# Patient Record
Sex: FEMALE | Race: OTHER | HISPANIC OR LATINO | Employment: UNEMPLOYED | RURAL
[De-identification: names, ages, dates, MRNs, and addresses within clinical notes are randomized per-mention and may not be internally consistent; named-entity substitution may affect disease eponyms.]

---

## 2024-11-13 ENCOUNTER — OFFICE VISIT (OUTPATIENT)
Dept: FAMILY MEDICINE | Facility: CLINIC | Age: 44
End: 2024-11-13
Payer: COMMERCIAL

## 2024-11-13 VITALS
TEMPERATURE: 98 F | HEIGHT: 63 IN | BODY MASS INDEX: 26.19 KG/M2 | HEART RATE: 71 BPM | SYSTOLIC BLOOD PRESSURE: 153 MMHG | DIASTOLIC BLOOD PRESSURE: 98 MMHG | WEIGHT: 147.81 LBS | OXYGEN SATURATION: 100 % | RESPIRATION RATE: 18 BRPM

## 2024-11-13 DIAGNOSIS — Z11.4 SCREENING FOR HIV (HUMAN IMMUNODEFICIENCY VIRUS): ICD-10-CM

## 2024-11-13 DIAGNOSIS — Z71.9 HEALTH EDUCATION/COUNSELING: ICD-10-CM

## 2024-11-13 DIAGNOSIS — I10 PRIMARY HYPERTENSION: Primary | ICD-10-CM

## 2024-11-13 DIAGNOSIS — Z11.59 NEED FOR HEPATITIS C SCREENING TEST: ICD-10-CM

## 2024-11-13 PROCEDURE — 3077F SYST BP >= 140 MM HG: CPT | Mod: CPTII,,, | Performed by: FAMILY MEDICINE

## 2024-11-13 PROCEDURE — 1159F MED LIST DOCD IN RCRD: CPT | Mod: CPTII,,, | Performed by: FAMILY MEDICINE

## 2024-11-13 PROCEDURE — 99203 OFFICE O/P NEW LOW 30 MIN: CPT | Mod: GC,,, | Performed by: FAMILY MEDICINE

## 2024-11-13 PROCEDURE — 3080F DIAST BP >= 90 MM HG: CPT | Mod: CPTII,,, | Performed by: FAMILY MEDICINE

## 2024-11-13 PROCEDURE — 3008F BODY MASS INDEX DOCD: CPT | Mod: CPTII,,, | Performed by: FAMILY MEDICINE

## 2024-11-13 PROCEDURE — 4010F ACE/ARB THERAPY RXD/TAKEN: CPT | Mod: CPTII,,, | Performed by: FAMILY MEDICINE

## 2024-11-13 RX ORDER — ERGOCALCIFEROL 1.25 MG/1
50000 CAPSULE ORAL
COMMUNITY

## 2024-11-13 RX ORDER — LISINOPRIL AND HYDROCHLOROTHIAZIDE 12.5; 2 MG/1; MG/1
1 TABLET ORAL DAILY
COMMUNITY

## 2024-11-13 NOTE — PROGRESS NOTES
Subjective:       Patient ID: Edelmira Kelly is a 44 y.o. female.    Chief Complaint: Establish Care (Room 8 establish care, med refills, denies pain) and Health Maintenance (Tated mammogram was done 01/2024, colonoscope this year as well )    44 year old female presents to the clinic today to Establish care. States that has done screening test recently and we will get records.           Current Outpatient Medications:     ergocalciferol (VITAMIN D2) 50,000 unit Cap, Take 50,000 Units by mouth every 7 days., Disp: , Rfl:     lisinopriL-hydrochlorothiazide (PRINZIDE,ZESTORETIC) 20-12.5 mg per tablet, Take 1 tablet by mouth once daily., Disp: , Rfl:     Review of patient's allergies indicates:  No Known Allergies    Past Medical History:   Diagnosis Date    Hypertension        Past Surgical History:   Procedure Laterality Date    CHOLECYSTECTOMY         Family History   Problem Relation Name Age of Onset    Hypertension Mother      Diabetes Mellitus Father      Stroke Father         Social History     Tobacco Use    Smoking status: Never    Smokeless tobacco: Never   Substance Use Topics    Drug use: Never       Review of Systems   Constitutional:  Negative for appetite change, fatigue and fever.   HENT:  Negative for ear pain.    Eyes:  Negative for visual disturbance.   Respiratory:  Negative for cough, chest tightness and shortness of breath.    Cardiovascular:  Negative for chest pain and leg swelling.   Gastrointestinal:  Negative for abdominal pain, diarrhea, nausea and vomiting.   Genitourinary:  Negative for difficulty urinating and flank pain.   Musculoskeletal:  Negative for back pain and gait problem.   Integumentary:  Negative for rash and wound.   Neurological:  Negative for dizziness, syncope, light-headedness and headaches.   Psychiatric/Behavioral:  Negative for agitation and confusion.            Objective:      Vitals:    11/13/24 1441   BP: (!) 153/98   BP Location: Left arm   Patient Position:  "Sitting   Pulse: 71   Resp: 18   Temp: 97.9 °F (36.6 °C)   TempSrc: Oral   SpO2: 100%   Weight: 67 kg (147 lb 12.8 oz)   Height: 5' 3" (1.6 m)     Physical Exam  Vitals reviewed.   Constitutional:       General: She is not in acute distress.     Appearance: Normal appearance.   HENT:      Head: Normocephalic and atraumatic.      Right Ear: External ear normal.      Left Ear: External ear normal.      Nose: Nose normal. No congestion.      Mouth/Throat:      Mouth: Mucous membranes are moist.   Eyes:      Conjunctiva/sclera: Conjunctivae normal.   Cardiovascular:      Rate and Rhythm: Normal rate and regular rhythm.      Pulses: Normal pulses.      Heart sounds: Normal heart sounds.   Pulmonary:      Effort: Pulmonary effort is normal. No respiratory distress.      Breath sounds: Normal breath sounds.   Abdominal:      General: Bowel sounds are normal.   Musculoskeletal:         General: Normal range of motion.      Cervical back: Normal range of motion.   Skin:     General: Skin is warm.      Coloration: Skin is not jaundiced.   Neurological:      General: No focal deficit present.      Mental Status: She is alert.   Psychiatric:         Mood and Affect: Mood normal.         Behavior: Behavior normal.         Thought Content: Thought content normal.           Lab Results   Component Value Date    WBC 5.97 08/03/2022    HGB 13.8 08/03/2022    HCT 40.8 08/03/2022     08/03/2022    ALT 30 08/03/2022    AST 15 08/03/2022     08/03/2022    K 3.4 (L) 08/03/2022     08/03/2022    CREATININE 0.65 08/03/2022    BUN 10 08/03/2022    CO2 24 08/03/2022    TSH 0.774 08/03/2022    HGBA1C 6.0 08/03/2022      Assessment:       1. Primary hypertension    2. Need for hepatitis C screening test    3. Health education/counseling    4. Screening for HIV (human immunodeficiency virus)        Plan:         Problem List Items Addressed This Visit       HTN (hypertension) - Primary (Chronic)     BP today 153/98  Patient " states that did not take her medications for the past 2 days.  States that wish she could control her BP with no medications.  Counseling given about BP medications and long term complications of uncontrolled blood pressure.   Patient wants to establish care with us and states that has done blood work recently. She will get lab work records and bring to us. We will request medical records. Hold on blood work request. Await for records.  - return in 2 weeks    - monitor BP at home  - watch out for symptoms as headaches, dizziness, chest pain, palpitations, lightheadedness, or blurred vision  - manage a BP log and bring it to your next appointment  - maintain healthy life style with dietary restriction such as low salt and low sodium diet. Follow DASH diet.   - engage in physical exercise for at least 30 minutes a day/5 days a week  - take medications regularly as prescribed.              Health education/counseling     Lifestyle changes recommendations given.  Print out given about 6 pillars and recipes.    States some Anxiety but states that do not want medication for it and want to try lifestyle changes to help with that.           Other Visit Diagnoses       Need for hepatitis C screening test        Relevant Orders    Hepatitis C Antibody    Screening for HIV (human immunodeficiency virus)        Relevant Orders    HIV 1/2 Ag/Ab (4th Gen)              Follow up in about 2 weeks (around 11/27/2024).    Maciej Johnson MD     Instructed patient that if symptoms fail to improve or worsen patient should seek immediate medical attention or report to the nearest emergency department. Patient expressed verbal agreement and understanding to this plan of care.

## 2024-11-13 NOTE — ASSESSMENT & PLAN NOTE
BP today 153/98  Patient states that did not take her medications for the past 2 days.  States that wish she could control her BP with no medications.  Counseling given about BP medications and long term complications of uncontrolled blood pressure.   Patient wants to establish care with us and states that has done blood work recently. She will get lab work records and bring to us. We will request medical records. Hold on blood work request. Await for records.  - return in 2 weeks    - monitor BP at home  - watch out for symptoms as headaches, dizziness, chest pain, palpitations, lightheadedness, or blurred vision  - manage a BP log and bring it to your next appointment  - maintain healthy life style with dietary restriction such as low salt and low sodium diet. Follow DASH diet.   - engage in physical exercise for at least 30 minutes a day/5 days a week  - take medications regularly as prescribed.

## 2024-11-13 NOTE — ASSESSMENT & PLAN NOTE
Lifestyle changes recommendations given.  Print out given about 6 pillars and recipes.    States some Anxiety but states that do not want medication for it and want to try lifestyle changes to help with that.    0 = swallows foods/liquids without difficulty

## 2024-11-27 ENCOUNTER — OFFICE VISIT (OUTPATIENT)
Dept: FAMILY MEDICINE | Facility: CLINIC | Age: 44
End: 2024-11-27
Payer: COMMERCIAL

## 2024-11-27 VITALS
OXYGEN SATURATION: 98 % | TEMPERATURE: 98 F | BODY MASS INDEX: 25.87 KG/M2 | HEIGHT: 63 IN | SYSTOLIC BLOOD PRESSURE: 123 MMHG | HEART RATE: 84 BPM | DIASTOLIC BLOOD PRESSURE: 80 MMHG | RESPIRATION RATE: 16 BRPM | WEIGHT: 146 LBS

## 2024-11-27 DIAGNOSIS — I10 PRIMARY HYPERTENSION: Primary | Chronic | ICD-10-CM

## 2024-11-27 DIAGNOSIS — Z71.9 HEALTH EDUCATION/COUNSELING: ICD-10-CM

## 2024-11-27 RX ORDER — PANTOPRAZOLE SODIUM 40 MG/1
40 TABLET, DELAYED RELEASE ORAL DAILY
COMMUNITY
Start: 2024-11-14 | End: 2025-11-14

## 2024-11-27 RX ORDER — ASPIRIN 325 MG
TABLET, DELAYED RELEASE (ENTERIC COATED) ORAL
COMMUNITY
Start: 2024-11-25

## 2024-11-27 NOTE — ASSESSMENT & PLAN NOTE
BP today 123/80  Patient states that has been taking her medications daily as prescribed.   Counseling given about BP medications and long term complications of uncontrolled blood pressure.   She will get lab work records and bring to us. Medical records requested.   Hold on blood work request. Await for records.  - return in 3 months.     - monitor BP at home  - watch out for symptoms as headaches, dizziness, chest pain, palpitations, lightheadedness, or blurred vision  - manage a BP log and bring it to your next appointment  - maintain healthy life style with dietary restriction such as low salt and low sodium diet. Follow DASH diet.   - engage in physical exercise for at least 30 minutes a day/5 days a week  - take medications regularly as prescribed.

## 2024-11-27 NOTE — PROGRESS NOTES
"Subjective:       Patient ID: Edelmira Kelly is a 44 y.o. female.    Chief Complaint: Follow-up    44 year old female presents to the clinic today for HTN 2 weeks follow up. States that is taking medications as prescribed.           Current Outpatient Medications:     cholecalciferol, vitamin D3, 1,250 mcg (50,000 unit) capsule, SMARTSI.0 Capsule(s) By Mouth Once a Week, Disp: , Rfl:     lisinopriL-hydrochlorothiazide (PRINZIDE,ZESTORETIC) 20-12.5 mg per tablet, Take 1 tablet by mouth once daily., Disp: , Rfl:     pantoprazole (PROTONIX) 40 MG tablet, Take 40 mg by mouth once daily., Disp: , Rfl:     ergocalciferol (VITAMIN D2) 50,000 unit Cap, Take 50,000 Units by mouth every 7 days. (Patient not taking: Reported on 2024), Disp: , Rfl:     Review of patient's allergies indicates:  No Known Allergies    Past Medical History:   Diagnosis Date    Hypertension        Past Surgical History:   Procedure Laterality Date    CHOLECYSTECTOMY         Family History   Problem Relation Name Age of Onset    Hypertension Mother      Diabetes Mellitus Father      Stroke Father         Social History     Tobacco Use    Smoking status: Never    Smokeless tobacco: Never   Substance Use Topics    Drug use: Never       Review of Systems   Constitutional:  Negative for fatigue and fever.   Eyes:  Negative for visual disturbance.   Respiratory:  Negative for shortness of breath.    Cardiovascular:  Negative for chest pain and leg swelling.   Gastrointestinal:  Negative for abdominal pain.   Genitourinary:  Negative for flank pain.   Neurological:  Negative for dizziness and headaches.           Objective:      Vitals:    24 1509   BP: 123/80   BP Location: Left arm   Patient Position: Sitting   Pulse: 84   Resp: 16   Temp: 98 °F (36.7 °C)   TempSrc: Oral   SpO2: 98%   Weight: 66.2 kg (146 lb)   Height: 5' 3" (1.6 m)     Physical Exam  Vitals reviewed.   Constitutional:       General: She is not in acute distress.     " Appearance: Normal appearance.   HENT:      Head: Normocephalic and atraumatic.      Right Ear: External ear normal.      Left Ear: External ear normal.      Nose: Nose normal. No congestion.      Mouth/Throat:      Mouth: Mucous membranes are moist.   Eyes:      Conjunctiva/sclera: Conjunctivae normal.   Cardiovascular:      Rate and Rhythm: Normal rate and regular rhythm.      Pulses: Normal pulses.      Heart sounds: Normal heart sounds. No murmur heard.  Pulmonary:      Effort: Pulmonary effort is normal. No respiratory distress.      Breath sounds: Normal breath sounds.   Abdominal:      General: Bowel sounds are normal.   Musculoskeletal:         General: Normal range of motion.      Cervical back: Normal range of motion.   Skin:     General: Skin is warm.      Coloration: Skin is not jaundiced.   Neurological:      General: No focal deficit present.      Mental Status: She is alert.   Psychiatric:         Mood and Affect: Mood normal.         Behavior: Behavior normal.         Thought Content: Thought content normal.           Lab Results   Component Value Date    WBC 5.97 08/03/2022    HGB 13.8 08/03/2022    HCT 40.8 08/03/2022     08/03/2022    ALT 30 08/03/2022    AST 15 08/03/2022     08/03/2022    K 3.4 (L) 08/03/2022     08/03/2022    CREATININE 0.65 08/03/2022    BUN 10 08/03/2022    CO2 24 08/03/2022    TSH 0.774 08/03/2022    HGBA1C 6.0 08/03/2022      Assessment:       1. Primary hypertension    2. Health education/counseling        Plan:         Problem List Items Addressed This Visit       HTN (hypertension) - Primary (Chronic)     BP today 123/80  Patient states that has been taking her medications daily as prescribed.   Counseling given about BP medications and long term complications of uncontrolled blood pressure.   She will get lab work records and bring to us. Medical records requested.   Hold on blood work request. Await for records.  - return in 3 months.     - monitor  BP at home  - watch out for symptoms as headaches, dizziness, chest pain, palpitations, lightheadedness, or blurred vision  - manage a BP log and bring it to your next appointment  - maintain healthy life style with dietary restriction such as low salt and low sodium diet. Follow DASH diet.   - engage in physical exercise for at least 30 minutes a day/5 days a week  - take medications regularly as prescribed.              Health education/counseling     Lifestyle changes recommendations given.  Print out given about 6 pillars and recipes.    States some Anxiety but states that do not want medication for it and want to try lifestyle changes to help with that.               Follow up in about 3 months (around 2/27/2025).    Maciej Johnson MD     Instructed patient that if symptoms fail to improve or worsen patient should seek immediate medical attention or report to the nearest emergency department. Patient expressed verbal agreement and understanding to this plan of care.